# Patient Record
Sex: FEMALE | Race: WHITE | Employment: FULL TIME | URBAN - METROPOLITAN AREA
[De-identification: names, ages, dates, MRNs, and addresses within clinical notes are randomized per-mention and may not be internally consistent; named-entity substitution may affect disease eponyms.]

---

## 2017-05-05 LAB
ANTIBODY SCREEN, EXTERNAL: NORMAL
CHLAMYDIA, EXTERNAL: NORMAL
HBSAG, EXTERNAL: NORMAL
HIV, EXTERNAL: NORMAL
N. GONORRHEA, EXTERNAL: NORMAL
RPR, EXTERNAL: NORMAL
RUBELLA, EXTERNAL: NORMAL
TYPE, ABO & RH, EXTERNAL: NORMAL

## 2017-11-10 LAB — GRBS, EXTERNAL: NEGATIVE

## 2017-12-24 ENCOUNTER — HOSPITAL ENCOUNTER (EMERGENCY)
Age: 37
Discharge: HOME | End: 2017-12-24
Attending: OBSTETRICS & GYNECOLOGY | Admitting: OBSTETRICS & GYNECOLOGY
Payer: COMMERCIAL

## 2017-12-24 PROCEDURE — 99283 EMERGENCY DEPT VISIT LOW MDM: CPT

## 2017-12-24 PROCEDURE — 59025 FETAL NON-STRESS TEST: CPT

## 2017-12-24 NOTE — IP AVS SNAPSHOT
[address] [facility] 
 
 
 [address] [place] [address] 
[phone] Patient: Princess Mayo MRN: [id number] :1980 About your hospitalization You were admitted on:  2017 You last received care in the:  THE [address] [address] You were discharged on:  2017 Why you were hospitalized Your primary diagnosis was:  Not on File Things You Need To Do (next 8 weeks) Follow up with Florida Low MD  
  
Phone:  [phone] Where:  11 [facility], [address] [place] [address] Discharge Orders None A check nely indicates which time of day the medication should be taken. My Medications Notice You have not been prescribed any medications. Discharge Instructions None Introducing [facility] SERVICES! Camilo Hannah introduces [employer] patient portal. Now you can access parts of your medical record, email your doctor's office, and request medication refills online. 1. In your internet browser, go to [email or web address] 2. Click on the First Time User? Click Here link in the Sign In box. You will see the New Member Sign Up page. 3. Enter your [employer] Access Code exactly as it appears below. You will not need to use this code after youve completed the sign-up process. If you do not sign up before the expiration date, you must request a new code. · [employer] Access Code: [email or web address] Expires: 3/24/2018 11:06 PM 
 
4. Enter the last four digits of your Social Security Number (xxxx) and Date of Birth (mm/dd/yyyy) as indicated and click Submit. You will be taken to the next sign-up page. 5. Create a [employer] ID. This will be your [employer] login ID and cannot be changed, so think of one that is secure and easy to remember. 6. Create a [employer] password. You can change your password at any time. 7. Enter your Password Reset Question and Answer. This can be used at a later time if you forget your password. 8. Enter your e-mail address. You will receive e-mail notification when new information is available in [address] 9. Click Sign Up. You can now view and download portions of your medical record. 10. Click the Download Summary menu link to download a portable copy of your medical information. If you have questions, please visit the Frequently Asked Questions section of the [employer] website. Remember, [employer] is NOT to be used for urgent needs. For medical emergencies, dial 911. Now available from your iPhone and Android! Providers Seen During Your Hospitalization Provider Specialty Primary office phone Karla Campos MD Obstetrics & Gynecology [phone] Your Primary Care Physician (PCP) Primary Care Physician Office Phone Office Fax 510 Marcelo Rodriguez [phone] You are allergic to the following Not on File Recent Documentation OB Status Pregnant Emergency Contacts Name Discharge Info Relation Home Work Mobile BlakealexaRudy DISCHARGE CAREGIVER [3] Spouse [3]   [phone] Patient Belongings The following personal items are in your possession at time of discharge: 
                             
 
  
  
Discharge Instructions Attachments/References PREGNANCY: WEEK 41 (ENGLISH) PREGNANCY: KICK COUNTS (ENGLISH) Patient Handouts Week 41 of Your Pregnancy: Care Instructions Your Care Instructions By now, you're probably tired of people asking you when the baby is going to be born. Your baby could come any Cecile Gong today! Your doctor wants to make sure that you have a safe birth and so may recommend giving you medicines to start labor or scheduling a  delivery. Most women who give birth after their due dates have healthy newborns.  But you may have tests to make sure everything is okay. This care sheet will help you prepare for giving birth after 41 weeks. Follow-up care is a key part of your treatment and safety. Be sure to make and go to all appointments, and call your doctor if you are having problems. It's also a good idea to know your test results and keep a list of the medicines you take. How can you care for yourself at home? At about 41 weeks · Your doctor will measure the amount of fluid surrounding the baby and test your baby's movement and heart rate. · If your baby seems strong and well, your doctor might give you medicine to start labor. · If there are other concerns, your doctor may tell you that a  delivery would be best for you and your baby. After 42 weeks · Your baby may be harder to deliver. · The placenta may no longer be able to meet your baby's needs. · The baby might have a bowel movement into the amniotic fluid, which could go into the baby's lungs. Ease or reduce swelling in your feet, ankles, hands, and fingers · If your fingers are puffy, take off your rings. · Do not eat high-salt foods, such as potato chips. · Prop up your feet on a stool or couch as much as possible. Sleep with pillows under your feet. · Do not stand for long periods of time or wear tight shoes. · Wear support stockings. Where can you learn more? Go to http://richard-myles.info/. Enter U825 in the search box to learn more about \"Week 41 of Your Pregnancy: Care Instructions. \" Current as of: 2017 Content Version: 11.4 © 5510-9897 [employer]. Care instructions adapted under license by [employer] (which disclaims liability or warranty for this information). If you have questions about a medical condition or this instruction, always ask your healthcare professional. [place] [address] any warranty or liability for your use of this information. Counting Your Baby's Kicks: Care Instructions Your Care Instructions Counting your baby's kicks is one way your doctor can tell that your baby is healthy. Most women-especially in a first pregnancy-feel their baby move for the first time between 16 and 22 weeks. The movement may feel like flutters rather than kicks. Your baby may move more at certain times of the day. When you are active, you may notice less kicking than when you are resting. At your prenatal visits, your doctor will ask whether the baby is active. In your last trimester, your doctor may ask you to count the number of times you feel your baby move. Follow-up care is a key part of your treatment and safety. Be sure to make and go to all appointments, and call your doctor if you are having problems. It's also a good idea to know your test results and keep a list of the medicines you take. How do you count fetal kicks? · A common method of checking your baby's movement is to count the number of kicks or moves you feel in 1 hour. Ten movements (such as kicks, flutters, or rolls) in 1 hour are normal. Some doctors suggest that you count in the morning until you get to 10 movements. Then you can quit for that day and start again the next day. · Pick your baby's most active time of day to count. This may be any time from morning to evening. · If you do not feel 10 movements in an hour, your baby may be sleeping. Wait for the next hour and count again. When should you call for help? Call your doctor now or seek immediate medical care if: 
? · You noticed that your baby has stopped moving or is moving much less than normal. ? Watch closely for changes in your health, and be sure to contact your doctor if you have any problems. Where can you learn more? Go to http://richard-myles.info/. Enter C067 in the search box to learn more about \"Counting Your Baby's Kicks: Care Instructions. \" Current as of: March 16, 2017 Content Version: 11.4 © 8386-4743 Healthwise, Incorporated. Care instructions adapted under license by [employer] (which disclaims liability or warranty for this information). If you have questions about a medical condition or this instruction, always ask your healthcare professional. [address] any warranty or liability for your use of this information. Please provide this summary of care documentation to your next provider. Signatures-by signing, you are acknowledging that this After Visit Summary has been reviewed with you and you have received a copy. Patient Signature:  ____________________________________________________________ Date:  ____________________________________________________________  
  
[facility] Provider Signature:  ____________________________________________________________ Date:  ____________________________________________________________

## 2017-12-24 NOTE — IP AVS SNAPSHOT
[address] [place] [address] 
[phone] Patient: Elvis Lynn MRN: [id number] :1980 My Medications Notice You have not been prescribed any medications.

## 2017-12-25 PROCEDURE — 59025 FETAL NON-STRESS TEST: CPT

## 2017-12-25 PROCEDURE — 99283 EMERGENCY DEPT VISIT LOW MDM: CPT

## 2017-12-25 NOTE — PROGRESS NOTES
2100:   ambulated to unit c/o ctx.      :  SVE /-3. Pt advised to walk for 1 hour, then recheck per Dr Linda Calderon    :  Pt in ceja ambulating    :  SVE /-3.      :  Discussed d/c orders with pt. Questions answered.   Pt ambulated off unit with family

## 2017-12-26 ENCOUNTER — ANESTHESIA EVENT (OUTPATIENT)
Dept: LABOR AND DELIVERY | Age: 37
End: 2017-12-26
Payer: COMMERCIAL

## 2017-12-26 ENCOUNTER — HOSPITAL ENCOUNTER (INPATIENT)
Age: 37
LOS: 3 days | Discharge: HOME | End: 2017-12-29
Attending: OBSTETRICS & GYNECOLOGY | Admitting: OBSTETRICS & GYNECOLOGY
Payer: COMMERCIAL

## 2017-12-26 ENCOUNTER — ANESTHESIA (OUTPATIENT)
Dept: LABOR AND DELIVERY | Age: 37
End: 2017-12-26
Payer: COMMERCIAL

## 2017-12-26 PROBLEM — O48.0 POST-DATES PREGNANCY: Status: ACTIVE | Noted: 2017-12-26

## 2017-12-26 LAB
ABO + RH BLD: NORMAL
ALBUMIN SERPL-MCNC: 2.5 G/DL (ref 3.4–5)
ALBUMIN/GLOB SERPL: 0.6 {RATIO} (ref 0.8–1.7)
ALP SERPL-CCNC: 141 U/L (ref 45–117)
ALT SERPL-CCNC: 16 U/L (ref 13–56)
ANION GAP SERPL CALC-SCNC: 12 MMOL/L (ref 3–18)
AST SERPL-CCNC: 16 U/L (ref 15–37)
BASOPHILS # BLD: 0 K/UL (ref 0–0.06)
BASOPHILS NFR BLD: 0 % (ref 0–2)
BILIRUB SERPL-MCNC: 0.2 MG/DL (ref 0.2–1)
BLOOD GROUP ANTIBODIES SERPL: NORMAL
BUN SERPL-MCNC: 14 MG/DL (ref 7–18)
BUN/CREAT SERPL: 14 (ref 12–20)
CALCIUM SERPL-MCNC: 9.3 MG/DL (ref 8.5–10.1)
CHLORIDE SERPL-SCNC: 102 MMOL/L (ref 100–108)
CO2 SERPL-SCNC: 23 MMOL/L (ref 21–32)
CREAT SERPL-MCNC: 1 MG/DL (ref 0.6–1.3)
DIFFERENTIAL METHOD BLD: ABNORMAL
EOSINOPHIL # BLD: 0.1 K/UL (ref 0–0.4)
EOSINOPHIL NFR BLD: 1 % (ref 0–5)
ERYTHROCYTE [DISTWIDTH] IN BLOOD BY AUTOMATED COUNT: 13.9 % (ref 11.6–14.5)
GLOBULIN SER CALC-MCNC: 4.2 G/DL (ref 2–4)
GLUCOSE SERPL-MCNC: 105 MG/DL (ref 74–99)
HCT VFR BLD AUTO: 37.4 % (ref 35–45)
HGB BLD-MCNC: 13.2 G/DL (ref 12–16)
LDH SERPL L TO P-CCNC: 163 U/L (ref 81–234)
LYMPHOCYTES # BLD: 1.8 K/UL (ref 0.9–3.6)
LYMPHOCYTES NFR BLD: 20 % (ref 21–52)
MCH RBC QN AUTO: 34 PG (ref 24–34)
MCHC RBC AUTO-ENTMCNC: 35.3 G/DL (ref 31–37)
MCV RBC AUTO: 96.4 FL (ref 74–97)
MONOCYTES # BLD: 0.5 K/UL (ref 0.05–1.2)
MONOCYTES NFR BLD: 5 % (ref 3–10)
NEUTS SEG # BLD: 6.8 K/UL (ref 1.8–8)
NEUTS SEG NFR BLD: 74 % (ref 40–73)
PLATELET # BLD AUTO: 202 K/UL (ref 135–420)
PMV BLD AUTO: 11.7 FL (ref 9.2–11.8)
POTASSIUM SERPL-SCNC: 3.8 MMOL/L (ref 3.5–5.5)
PROT SERPL-MCNC: 6.7 G/DL (ref 6.4–8.2)
RBC # BLD AUTO: 3.88 M/UL (ref 4.2–5.3)
RBC MORPH BLD: ABNORMAL
SODIUM SERPL-SCNC: 137 MMOL/L (ref 136–145)
SPECIMEN EXP DATE BLD: NORMAL
URATE SERPL-MCNC: 5.6 MG/DL (ref 2.6–7.2)
WBC # BLD AUTO: 9.2 K/UL (ref 4.6–13.2)

## 2017-12-26 PROCEDURE — 76060000078 HC EPIDURAL ANESTHESIA

## 2017-12-26 PROCEDURE — 74011250636 HC RX REV CODE- 250/636: Performed by: ANESTHESIOLOGY

## 2017-12-26 PROCEDURE — 85025 COMPLETE CBC W/AUTO DIFF WBC: CPT | Performed by: OBSTETRICS & GYNECOLOGY

## 2017-12-26 PROCEDURE — 84550 ASSAY OF BLOOD/URIC ACID: CPT | Performed by: OBSTETRICS & GYNECOLOGY

## 2017-12-26 PROCEDURE — 86900 BLOOD TYPING SEROLOGIC ABO: CPT | Performed by: OBSTETRICS & GYNECOLOGY

## 2017-12-26 PROCEDURE — 74011250636 HC RX REV CODE- 250/636

## 2017-12-26 PROCEDURE — 80053 COMPREHEN METABOLIC PANEL: CPT | Performed by: OBSTETRICS & GYNECOLOGY

## 2017-12-26 PROCEDURE — 10907ZC DRAINAGE OF AMNIOTIC FLUID, THERAPEUTIC FROM PRODUCTS OF CONCEPTION, VIA NATURAL OR ARTIFICIAL OPENING: ICD-10-PCS | Performed by: OBSTETRICS & GYNECOLOGY

## 2017-12-26 PROCEDURE — 77030034849

## 2017-12-26 PROCEDURE — 83615 LACTATE (LD) (LDH) ENZYME: CPT | Performed by: OBSTETRICS & GYNECOLOGY

## 2017-12-26 PROCEDURE — 77030018749 HC HK AMNIO DISP DERY -A

## 2017-12-26 PROCEDURE — 36415 COLL VENOUS BLD VENIPUNCTURE: CPT | Performed by: OBSTETRICS & GYNECOLOGY

## 2017-12-26 PROCEDURE — 99282 EMERGENCY DEPT VISIT SF MDM: CPT

## 2017-12-26 PROCEDURE — 4A1H74Z MONITORING OF PRODUCTS OF CONCEPTION, CARDIAC ELECTRICAL ACTIVITY, VIA NATURAL OR ARTIFICIAL OPENING: ICD-10-PCS | Performed by: OBSTETRICS & GYNECOLOGY

## 2017-12-26 PROCEDURE — 77030009413 HC ELECTRD SCALP COVD -A

## 2017-12-26 PROCEDURE — 74011250636 HC RX REV CODE- 250/636: Performed by: OBSTETRICS & GYNECOLOGY

## 2017-12-26 PROCEDURE — 77030007879 HC KT SPN EPDRL TELE -B: Performed by: ANESTHESIOLOGY

## 2017-12-26 PROCEDURE — 3E033VJ INTRODUCTION OF OTHER HORMONE INTO PERIPHERAL VEIN, PERCUTANEOUS APPROACH: ICD-10-PCS | Performed by: OBSTETRICS & GYNECOLOGY

## 2017-12-26 PROCEDURE — 74011000250 HC RX REV CODE- 250

## 2017-12-26 PROCEDURE — 65270000029 HC RM PRIVATE

## 2017-12-26 RX ORDER — LIDOCAINE HYDROCHLORIDE 10 MG/ML
INJECTION INFILTRATION; PERINEURAL AS NEEDED
Status: DISCONTINUED | OUTPATIENT
Start: 2017-12-26 | End: 2017-12-27 | Stop reason: HOSPADM

## 2017-12-26 RX ORDER — LIDOCAINE HYDROCHLORIDE 10 MG/ML
20 INJECTION, SOLUTION EPIDURAL; INFILTRATION; INTRACAUDAL; PERINEURAL AS NEEDED
Status: DISCONTINUED | OUTPATIENT
Start: 2017-12-26 | End: 2017-12-27 | Stop reason: HOSPADM

## 2017-12-26 RX ORDER — FENTANYL CITRATE 50 UG/ML
INJECTION, SOLUTION INTRAMUSCULAR; INTRAVENOUS AS NEEDED
Status: DISCONTINUED | OUTPATIENT
Start: 2017-12-26 | End: 2017-12-27 | Stop reason: HOSPADM

## 2017-12-26 RX ORDER — OXYTOCIN/RINGER'S LACTATE 20/1000 ML
125 PLASTIC BAG, INJECTION (ML) INTRAVENOUS CONTINUOUS
Status: DISCONTINUED | OUTPATIENT
Start: 2017-12-26 | End: 2017-12-27 | Stop reason: HOSPADM

## 2017-12-26 RX ORDER — MINERAL OIL
30 OIL (ML) ORAL AS NEEDED
Status: COMPLETED | OUTPATIENT
Start: 2017-12-26 | End: 2017-12-27

## 2017-12-26 RX ORDER — LIDOCAINE HYDROCHLORIDE AND EPINEPHRINE 15; 5 MG/ML; UG/ML
INJECTION, SOLUTION EPIDURAL AS NEEDED
Status: DISCONTINUED | OUTPATIENT
Start: 2017-12-26 | End: 2017-12-27 | Stop reason: HOSPADM

## 2017-12-26 RX ORDER — SODIUM CHLORIDE, SODIUM LACTATE, POTASSIUM CHLORIDE, CALCIUM CHLORIDE 600; 310; 30; 20 MG/100ML; MG/100ML; MG/100ML; MG/100ML
125 INJECTION, SOLUTION INTRAVENOUS CONTINUOUS
Status: DISCONTINUED | OUTPATIENT
Start: 2017-12-26 | End: 2017-12-27 | Stop reason: HOSPADM

## 2017-12-26 RX ORDER — SODIUM CHLORIDE 0.9 % (FLUSH) 0.9 %
5-10 SYRINGE (ML) INJECTION AS NEEDED
Status: DISCONTINUED | OUTPATIENT
Start: 2017-12-26 | End: 2017-12-27 | Stop reason: HOSPADM

## 2017-12-26 RX ORDER — SODIUM CHLORIDE 0.9 % (FLUSH) 0.9 %
5-10 SYRINGE (ML) INJECTION EVERY 8 HOURS
Status: DISCONTINUED | OUTPATIENT
Start: 2017-12-26 | End: 2017-12-27 | Stop reason: HOSPADM

## 2017-12-26 RX ORDER — PHENYLEPHRINE HCL IN 0.9% NACL 1 MG/10 ML
80 SYRINGE (ML) INTRAVENOUS AS NEEDED
Status: DISCONTINUED | OUTPATIENT
Start: 2017-12-26 | End: 2017-12-27 | Stop reason: HOSPADM

## 2017-12-26 RX ORDER — ZOLPIDEM TARTRATE 5 MG/1
5 TABLET ORAL
Status: DISCONTINUED | OUTPATIENT
Start: 2017-12-26 | End: 2017-12-29 | Stop reason: HOSPADM

## 2017-12-26 RX ORDER — BUTORPHANOL TARTRATE 2 MG/ML
2 INJECTION INTRAMUSCULAR; INTRAVENOUS
Status: DISCONTINUED | OUTPATIENT
Start: 2017-12-26 | End: 2017-12-27 | Stop reason: HOSPADM

## 2017-12-26 RX ORDER — FENTANYL CITRATE 50 UG/ML
100 INJECTION, SOLUTION INTRAMUSCULAR; INTRAVENOUS ONCE
Status: ACTIVE | OUTPATIENT
Start: 2017-12-26 | End: 2017-12-27

## 2017-12-26 RX ORDER — FENTANYL/ROPIVACAINE/NS/PF 2MCG/ML-.1
1-15 PLASTIC BAG, INJECTION (ML) EPIDURAL CONTINUOUS
Status: DISCONTINUED | OUTPATIENT
Start: 2017-12-26 | End: 2017-12-27 | Stop reason: HOSPADM

## 2017-12-26 RX ORDER — NALOXONE HYDROCHLORIDE 0.4 MG/ML
0.2 INJECTION, SOLUTION INTRAMUSCULAR; INTRAVENOUS; SUBCUTANEOUS AS NEEDED
Status: DISCONTINUED | OUTPATIENT
Start: 2017-12-26 | End: 2017-12-27 | Stop reason: HOSPADM

## 2017-12-26 RX ORDER — FENTANYL CITRATE 50 UG/ML
INJECTION, SOLUTION INTRAMUSCULAR; INTRAVENOUS
Status: DISPENSED
Start: 2017-12-26 | End: 2017-12-27

## 2017-12-26 RX ORDER — OXYTOCIN IN 5 % DEXTROSE 30/500 ML
.5-2 PLASTIC BAG, INJECTION (ML) INTRAVENOUS
Status: DISCONTINUED | OUTPATIENT
Start: 2017-12-26 | End: 2017-12-29 | Stop reason: HOSPADM

## 2017-12-26 RX ORDER — NALBUPHINE HYDROCHLORIDE 10 MG/ML
10 INJECTION, SOLUTION INTRAMUSCULAR; INTRAVENOUS; SUBCUTANEOUS
Status: DISCONTINUED | OUTPATIENT
Start: 2017-12-26 | End: 2017-12-27 | Stop reason: HOSPADM

## 2017-12-26 RX ORDER — HYDROMORPHONE HYDROCHLORIDE 2 MG/ML
1 INJECTION, SOLUTION INTRAMUSCULAR; INTRAVENOUS; SUBCUTANEOUS
Status: DISCONTINUED | OUTPATIENT
Start: 2017-12-26 | End: 2017-12-27 | Stop reason: HOSPADM

## 2017-12-26 RX ORDER — ONDANSETRON 2 MG/ML
4 INJECTION INTRAMUSCULAR; INTRAVENOUS
Status: DISCONTINUED | OUTPATIENT
Start: 2017-12-26 | End: 2017-12-27 | Stop reason: HOSPADM

## 2017-12-26 RX ORDER — OXYTOCIN IN 5 % DEXTROSE 30/500 ML
PLASTIC BAG, INJECTION (ML) INTRAVENOUS
Status: COMPLETED
Start: 2017-12-26 | End: 2017-12-26

## 2017-12-26 RX ORDER — TERBUTALINE SULFATE 1 MG/ML
0.25 INJECTION SUBCUTANEOUS
Status: DISCONTINUED | OUTPATIENT
Start: 2017-12-26 | End: 2017-12-27 | Stop reason: HOSPADM

## 2017-12-26 RX ORDER — MISOPROSTOL 100 UG/1
25 TABLET ORAL ONCE
Status: DISCONTINUED | OUTPATIENT
Start: 2017-12-26 | End: 2017-12-26

## 2017-12-26 RX ORDER — OXYTOCIN/RINGER'S LACTATE 20/1000 ML
500 PLASTIC BAG, INJECTION (ML) INTRAVENOUS ONCE
Status: ACTIVE | OUTPATIENT
Start: 2017-12-26 | End: 2017-12-26

## 2017-12-26 RX ORDER — METHYLERGONOVINE MALEATE 0.2 MG/ML
0.2 INJECTION INTRAVENOUS AS NEEDED
Status: DISCONTINUED | OUTPATIENT
Start: 2017-12-26 | End: 2017-12-27 | Stop reason: HOSPADM

## 2017-12-26 RX ADMIN — Medication 14 MILLI-UNITS/MIN: at 15:00

## 2017-12-26 RX ADMIN — Medication 2 MILLI-UNITS/MIN: at 09:00

## 2017-12-26 RX ADMIN — Medication 10 MILLI-UNITS/MIN: at 13:57

## 2017-12-26 RX ADMIN — ROPIVACAINE HYDROCHLORIDE 12 ML/HR: 10 INJECTION, SOLUTION EPIDURAL at 22:01

## 2017-12-26 RX ADMIN — Medication 12 MILLI-UNITS/MIN: at 14:19

## 2017-12-26 RX ADMIN — SODIUM CHLORIDE, SODIUM LACTATE, POTASSIUM CHLORIDE, AND CALCIUM CHLORIDE 125 ML/HR: 600; 310; 30; 20 INJECTION, SOLUTION INTRAVENOUS at 19:08

## 2017-12-26 RX ADMIN — FENTANYL CITRATE 100 MCG: 50 INJECTION, SOLUTION INTRAMUSCULAR; INTRAVENOUS at 15:38

## 2017-12-26 RX ADMIN — SODIUM CHLORIDE, SODIUM LACTATE, POTASSIUM CHLORIDE, AND CALCIUM CHLORIDE 1000 ML: 600; 310; 30; 20 INJECTION, SOLUTION INTRAVENOUS at 15:48

## 2017-12-26 RX ADMIN — BUTORPHANOL TARTRATE 2 MG: 2 INJECTION, SOLUTION INTRAMUSCULAR; INTRAVENOUS at 13:35

## 2017-12-26 RX ADMIN — LIDOCAINE HYDROCHLORIDE AND EPINEPHRINE 2 ML: 15; 5 INJECTION, SOLUTION EPIDURAL at 15:36

## 2017-12-26 RX ADMIN — LIDOCAINE HYDROCHLORIDE AND EPINEPHRINE 3 ML: 15; 5 INJECTION, SOLUTION EPIDURAL at 15:37

## 2017-12-26 RX ADMIN — SODIUM CHLORIDE, SODIUM LACTATE, POTASSIUM CHLORIDE, AND CALCIUM CHLORIDE 125 ML/HR: 600; 310; 30; 20 INJECTION, SOLUTION INTRAVENOUS at 08:00

## 2017-12-26 RX ADMIN — Medication 4 MILLI-UNITS/MIN: at 17:08

## 2017-12-26 RX ADMIN — LIDOCAINE HYDROCHLORIDE 5 ML: 10 INJECTION INFILTRATION; PERINEURAL at 15:31

## 2017-12-26 RX ADMIN — ROPIVACAINE HYDROCHLORIDE 12 ML/HR: 10 INJECTION, SOLUTION EPIDURAL at 15:41

## 2017-12-26 RX ADMIN — SODIUM CHLORIDE, SODIUM LACTATE, POTASSIUM CHLORIDE, AND CALCIUM CHLORIDE 125 ML/HR: 600; 310; 30; 20 INJECTION, SOLUTION INTRAVENOUS at 13:58

## 2017-12-26 NOTE — IP AVS SNAPSHOT
[address] [place] [address] 
[phone] Patient: Imelda Senior MRN: [id number] :1980 My Medications TAKE these medications as instructed Instructions Each Dose to Equal  
 Morning Noon Evening Bedtime  
 ibuprofen 800 mg tablet Commonly known as:  MOTRIN Your last dose was: Your next dose is: Take 1 Tab by mouth every eight (8) hours as needed. Indications: Pain 800 mg PRENATAL PO Your last dose was: Your next dose is: Take  by mouth. VITAMIN D2 PO Your last dose was: Your next dose is: Take  by mouth. Where to Get Your Medications Information on where to get these meds will be given to you by the nurse or doctor. ! Ask your nurse or doctor about these medications  
  ibuprofen 800 mg tablet

## 2017-12-26 NOTE — ANESTHESIA PROCEDURE NOTES
Epidural Block    Start time: 12/26/2017 3:28 PM  End time: 12/26/2017 3:41 PM  Performed by: Venkatesh Potts  Authorized by: Venkatesh Potts     Pre-Procedure  Indication: at surgeon's request and labor epidural    Preanesthetic Checklist: patient identified, risks and benefits discussed, anesthesia consent, site marked, patient being monitored, timeout performed and anesthesia consent    Timeout Time: 15:28        Epidural:   Patient position:  Seated  Prep region:  Lumbar  Prep: Chlorhexidine    Location:  L3-4    Needle and Epidural Catheter:   Needle Type:  Tuohy  Needle Gauge:  17 G  Injection Technique:  Loss of resistance using air and loss of resistance using saline  Attempts:  1  Catheter Size:  20 G  Catheter at Skin Depth (cm):  10  Depth in Epidural Space (cm):  5  Events: no blood with aspiration, no cerebrospinal fluid with aspiration and no paresthesia    Test Dose:  Lidocaine 1.5% w/ epi and negative    Assessment:   Catheter Secured:  Tegaderm and tape  Insertion:  Uncomplicated  Patient tolerance:  Patient tolerated the procedure well with no immediate complications  After epidural placement, patient without sensory or motor block. Patient denies headache or backache.

## 2017-12-26 NOTE — PROGRESS NOTES
5747 Dr. Alma Katz informed of SVE, orders received for one time dose of 25mcg vaginal cytotec, pt bandar on own at this time. Will continue with original plan for pitocin induction. FHT reviewed by MD at this time. 56 Dr. Alma Katz informed of elevated BPs, POC is to add on [facility] panel. MD will be to the unit to assess labor progress by noon. 1130 [facility] panel reviewed with Dr. Alma Katz, no orders received. 1500 Bedside and Verbal shift change report given to KELY Lawson RN (oncoming nurse) by Gregoria Willis Rn (offgoing nurse). Report included the following information SBAR, Kardex, Intake/Output and MAR.

## 2017-12-26 NOTE — ANESTHESIA PREPROCEDURE EVALUATION
Anesthetic History   No history of anesthetic complications            Review of Systems / Medical History  Patient summary reviewed, nursing notes reviewed and pertinent labs reviewed    Pulmonary  Within defined limits                 Neuro/Psych   Within defined limits           Cardiovascular  Within defined limits                Exercise tolerance: >4 METS     GI/Hepatic/Renal  Within defined limits              Endo/Other  Within defined limits           Other Findings              Physical Exam    Airway  Mallampati: III  TM Distance: 4 - 6 cm  Neck ROM: decreased range of motion   Mouth opening: Normal     Cardiovascular  Regular rate and rhythm,  S1 and S2 normal,  no murmur, click, rub, or gallop  Rhythm: regular  Rate: normal         Dental  No notable dental hx       Pulmonary  Breath sounds clear to auscultation               Abdominal  GI exam deferred       Other Findings            Anesthetic Plan    ASA: 2  Anesthesia type: epidural            Anesthetic plan and risks discussed with: Patient and Spouse      Risks of bleeding, infection, nerve injury, failed block, spinal tap causing headache and requiring epidural blood patch, back pain, and failed block discussed. Procedure described as elective. Pt understands and desires to proceed.

## 2017-12-26 NOTE — PROGRESS NOTES
OB Progress Note    S: Pt reports pain moderate    O: Patient Vitals for the past 4 hrs:   Temp Pulse Resp BP   17 1306 97.9 °F (36.6 °C) - - -   17 1129 97.4 °F (36.3 °C) 90 17 141/86   17 1127 - 90 - 141/86   17 1003 - 84 - 141/90            VE: 2/60/-1; AROM to thin meconium       TOCO: irregular       FHT: category 1        Presentation:  A/P: IUP at  [id number]          Anticipate           GBS -    Jarvis Cooley MD  2017

## 2017-12-26 NOTE — IP AVS SNAPSHOT
[address] [place] [address] 
[phone] Patient: Onelia Wilkerson MRN: [id number] :1980 About your hospitalization You were admitted on:  2017 You last received care in the:  [address] [place] [address] You were discharged on:  2017 Why you were hospitalized Your primary diagnosis was:  Not on File Your diagnoses also included:  Post-Dates Pregnancy, Meconium In Amniotic Fluid First Noted During Labor Or Delivery In Liveborn Infant, Variable Fetal Heart Rate Decelerations, Delivered Things You Need To Do (next 8 weeks) Follow up with Willy Aguirre MD  
  
Phone:  [phone] Where:  [address] [facility], [address] [place] [address] Discharge Orders None A check nely indicates which time of day the medication should be taken. My Medications TAKE these medications as instructed Instructions Each Dose to Equal  
 Morning Noon Evening Bedtime  
 ibuprofen 800 mg tablet Commonly known as:  MOTRIN Your last dose was: Your next dose is: Take 1 Tab by mouth every eight (8) hours as needed. Indications: Pain 800 mg PRENATAL PO Your last dose was: Your next dose is: Take  by mouth. VITAMIN D2 PO Your last dose was: Your next dose is: Take  by mouth. Where to Get Your Medications Information on where to get these meds will be given to you by the nurse or doctor. ! Ask your nurse or doctor about these medications  
  ibuprofen 800 mg tablet Discharge Instructions None Introducing [facility] SERVICES! Roque De Leon introduces [employer] patient portal. Now you can access parts of your medical record, email your doctor's office, and request medication refills online. 1. In your internet browser, go to [email or web address] 2. Click on the First Time User? Click Here link in the Sign In box. You will see the New Member Sign Up page. 3. Enter your [employer] Access Code exactly as it appears below. You will not need to use this code after youve completed the sign-up process. If you do not sign up before the expiration date, you must request a new code. · [employer] Access Code: [email or web address] Expires: 3/24/2018 11:06 PM 
 
4. Enter the last four digits of your Social Security Number (xxxx) and Date of Birth (mm/dd/yyyy) as indicated and click Submit. You will be taken to the next sign-up page. 5. Create a [employer] ID. This will be your [employer] login ID and cannot be changed, so think of one that is secure and easy to remember. 6. Create a [employer] password. You can change your password at any time. 7. Enter your Password Reset Question and Answer. This can be used at a later time if you forget your password. 8. Enter your e-mail address. You will receive e-mail notification when new information is available in [address] 9. Click Sign Up. You can now view and download portions of your medical record. 10. Click the Download Summary menu link to download a portable copy of your medical information. If you have questions, please visit the Frequently Asked Questions section of the [employer] website. Remember, [employer] is NOT to be used for urgent needs. For medical emergencies, dial 911. Now available from your iPhone and Android! Providers Seen During Your Hospitalization Provider Specialty Primary office phone Leticia Brito MD Obstetrics & Gynecology [phone] Kadi Lopez MD Obstetrics & Gynecology [phone] Your Primary Care Physician (PCP) Primary Care Physician Office Phone Office Fax 510 Marcelo Rodriguezarlene [phone] You are allergic to the following No active allergies Recent Documentation Height Weight Breastfeeding? BMI OB Status 1.651 m 96.6 kg Unknown 35.45 kg/m2 Recent pregnancy Emergency Contacts Name Discharge Info Relation Home Work Mobile Rudy Freeman DISCHARGE CAREGIVER [3] Spouse [3]   [phone] Patient Belongings The following personal items are in your possession at time of discharge: 
  Dental Appliances: None  Visual Aid: Glasses      Home Medications: Kept at bedside (prenatals)   Jewelry: None  Clothing: At bedside, Pants, Shirt    Other Valuables: Cell Phone, [employer] Please provide this summary of care documentation to your next provider. Signatures-by signing, you are acknowledging that this After Visit Summary has been reviewed with you and you have received a copy. Patient Signature:  ____________________________________________________________ Date:  ____________________________________________________________  
  
[facility] Provider Signature:  ____________________________________________________________ Date:  ____________________________________________________________

## 2017-12-26 NOTE — PROGRESS NOTES
1505 patient request for epidural, IV blousing, supplies gathered   1 Dr. Christa Spaulding informed of patient request for epidural.   1523 Dr. Bartholomew Necessary at bedside   1528 Time out completed   1536 Epidural catheter in.  1537 Test dose  1538 Bolus Dose  1547 PCEA pump set up and double verified by MD, educated patient about PCEA button.

## 2017-12-26 NOTE — PROGRESS NOTES
36 Dr Dex Frederick at bedside, [facility] reviewed, SVE unchanged. Stated would possibly AROM pt at 1300.   1310 Dr. Dex Frederick at bedside, AROM patient, thin meconium. [facility] Dr. Dex Frederick at bedside, reviewed strip, pitocin restarted. 1630 Harper Inserted with second RN at [address] [facility] discussed with Benitez Farris , pitocin stopped, to be restarted in the morning, regular diet ordered at this time. 1921  Bedside and Verbal shift change report given to William BECERRA (oncoming nurse) by [place]. Maryellen Shelton RN (offgoing nurse). Report included the following informationSBAR, Kardex, Intake/Output, MAR and Recent Results.

## 2017-12-27 PROCEDURE — 75410000003 HC RECOV DEL/VAG/CSECN EA 0.5 HR

## 2017-12-27 PROCEDURE — 74011250636 HC RX REV CODE- 250/636: Performed by: OBSTETRICS & GYNECOLOGY

## 2017-12-27 PROCEDURE — 76060000078 HC EPIDURAL ANESTHESIA

## 2017-12-27 PROCEDURE — 74011000258 HC RX REV CODE- 258: Performed by: OBSTETRICS & GYNECOLOGY

## 2017-12-27 PROCEDURE — 65270000029 HC RM PRIVATE

## 2017-12-27 PROCEDURE — 0KQM0ZZ REPAIR PERINEUM MUSCLE, OPEN APPROACH: ICD-10-PCS | Performed by: OBSTETRICS & GYNECOLOGY

## 2017-12-27 PROCEDURE — 77010026065 HC OXYGEN MINIMUM MEDICAL AIR

## 2017-12-27 PROCEDURE — 74011000250 HC RX REV CODE- 250

## 2017-12-27 PROCEDURE — 77030010848 HC CATH INTUTR PRSS KOLB -B

## 2017-12-27 PROCEDURE — 75410000000 HC DELIVERY VAGINAL/SINGLE

## 2017-12-27 PROCEDURE — 74011250636 HC RX REV CODE- 250/636: Performed by: ANESTHESIOLOGY

## 2017-12-27 PROCEDURE — 74011250637 HC RX REV CODE- 250/637: Performed by: OBSTETRICS & GYNECOLOGY

## 2017-12-27 PROCEDURE — 74011250636 HC RX REV CODE- 250/636

## 2017-12-27 RX ORDER — PENICILLIN G POTASSIUM 5000000 [IU]/1
2.5 INJECTION, POWDER, FOR SOLUTION INTRAMUSCULAR; INTRAVENOUS EVERY 4 HOURS
Status: DISCONTINUED | OUTPATIENT
Start: 2017-12-27 | End: 2017-12-29

## 2017-12-27 RX ORDER — ACETAMINOPHEN 325 MG/1
650 TABLET ORAL
Status: DISCONTINUED | OUTPATIENT
Start: 2017-12-27 | End: 2017-12-29 | Stop reason: HOSPADM

## 2017-12-27 RX ORDER — FENTANYL CITRATE 50 UG/ML
INJECTION, SOLUTION INTRAMUSCULAR; INTRAVENOUS
Status: COMPLETED
Start: 2017-12-27 | End: 2017-12-27

## 2017-12-27 RX ORDER — AMOXICILLIN 250 MG
1 CAPSULE ORAL
Status: DISCONTINUED | OUTPATIENT
Start: 2017-12-27 | End: 2017-12-29 | Stop reason: HOSPADM

## 2017-12-27 RX ORDER — LIDOCAINE HYDROCHLORIDE 10 MG/ML
INJECTION INFILTRATION; PERINEURAL
Status: COMPLETED
Start: 2017-12-27 | End: 2017-12-27

## 2017-12-27 RX ORDER — BUPIVACAINE HYDROCHLORIDE 2.5 MG/ML
INJECTION, SOLUTION EPIDURAL; INFILTRATION; INTRACAUDAL AS NEEDED
Status: DISCONTINUED | OUTPATIENT
Start: 2017-12-27 | End: 2017-12-27 | Stop reason: HOSPADM

## 2017-12-27 RX ORDER — PENICILLIN G POTASSIUM 5000000 [IU]/1
5 INJECTION, POWDER, FOR SOLUTION INTRAMUSCULAR; INTRAVENOUS ONCE
Status: DISCONTINUED | OUTPATIENT
Start: 2017-12-27 | End: 2017-12-27 | Stop reason: CLARIF

## 2017-12-27 RX ORDER — PROMETHAZINE HYDROCHLORIDE 25 MG/ML
25 INJECTION, SOLUTION INTRAMUSCULAR; INTRAVENOUS
Status: DISCONTINUED | OUTPATIENT
Start: 2017-12-27 | End: 2017-12-29 | Stop reason: HOSPADM

## 2017-12-27 RX ORDER — IBUPROFEN 400 MG/1
800 TABLET ORAL
Status: DISCONTINUED | OUTPATIENT
Start: 2017-12-27 | End: 2017-12-29 | Stop reason: HOSPADM

## 2017-12-27 RX ORDER — OXYCODONE AND ACETAMINOPHEN 5; 325 MG/1; MG/1
2 TABLET ORAL
Status: DISCONTINUED | OUTPATIENT
Start: 2017-12-27 | End: 2017-12-29 | Stop reason: HOSPADM

## 2017-12-27 RX ORDER — ZOLPIDEM TARTRATE 5 MG/1
5 TABLET ORAL
Status: DISCONTINUED | OUTPATIENT
Start: 2017-12-27 | End: 2017-12-29 | Stop reason: HOSPADM

## 2017-12-27 RX ORDER — LIDOCAINE HYDROCHLORIDE 10 MG/ML
200 INJECTION INFILTRATION; PERINEURAL ONCE
Status: COMPLETED | OUTPATIENT
Start: 2017-12-27 | End: 2017-12-27

## 2017-12-27 RX ADMIN — LIDOCAINE HYDROCHLORIDE 3 ML: 10 INJECTION INFILTRATION; PERINEURAL at 09:51

## 2017-12-27 RX ADMIN — LIDOCAINE HYDROCHLORIDE AND EPINEPHRINE 3 ML: 15; 5 INJECTION, SOLUTION EPIDURAL at 09:56

## 2017-12-27 RX ADMIN — ROPIVACAINE HYDROCHLORIDE 12 ML/HR: 10 INJECTION, SOLUTION EPIDURAL at 08:12

## 2017-12-27 RX ADMIN — SODIUM CHLORIDE 5 MILLION UNITS: 900 INJECTION INTRAVENOUS at 12:27

## 2017-12-27 RX ADMIN — FENTANYL CITRATE 100 MCG: 50 INJECTION, SOLUTION INTRAMUSCULAR; INTRAVENOUS at 09:57

## 2017-12-27 RX ADMIN — Medication 30 ML: at 17:15

## 2017-12-27 RX ADMIN — LIDOCAINE HYDROCHLORIDE 20 ML: 10 INJECTION INFILTRATION; PERINEURAL at 17:30

## 2017-12-27 RX ADMIN — LIDOCAINE HYDROCHLORIDE 20 ML: 10 INJECTION, SOLUTION INFILTRATION; PERINEURAL at 17:30

## 2017-12-27 RX ADMIN — BUPIVACAINE HYDROCHLORIDE 6 ML: 2.5 INJECTION, SOLUTION EPIDURAL; INFILTRATION; INTRACAUDAL at 09:54

## 2017-12-27 RX ADMIN — IBUPROFEN 800 MG: 400 TABLET ORAL at 18:28

## 2017-12-27 RX ADMIN — ROPIVACAINE HYDROCHLORIDE 12 ML/HR: 10 INJECTION, SOLUTION EPIDURAL at 03:18

## 2017-12-27 RX ADMIN — LIDOCAINE HYDROCHLORIDE 3 ML: 10 INJECTION INFILTRATION; PERINEURAL at 12:32

## 2017-12-27 RX ADMIN — ROPIVACAINE HYDROCHLORIDE 12 ML/HR: 10 INJECTION, SOLUTION EPIDURAL at 13:31

## 2017-12-27 RX ADMIN — SODIUM CHLORIDE, SODIUM LACTATE, POTASSIUM CHLORIDE, AND CALCIUM CHLORIDE 125 ML/HR: 600; 310; 30; 20 INJECTION, SOLUTION INTRAVENOUS at 11:41

## 2017-12-27 RX ADMIN — LIDOCAINE HYDROCHLORIDE AND EPINEPHRINE 3 ML: 15; 5 INJECTION, SOLUTION EPIDURAL at 12:38

## 2017-12-27 RX ADMIN — BUPIVACAINE HYDROCHLORIDE 6 ML: 2.5 INJECTION, SOLUTION EPIDURAL; INFILTRATION; INTRACAUDAL at 12:36

## 2017-12-27 NOTE — PROGRESS NOTES
S. Great pain relief from epidural  O. Mat VSS. BP range 130-140-/ 80-90     FHR reactive- occas variable decels to 70's w/ contrac      Irregular contraction pattern      SVE unchanged from last exam. Posterior position    A. Slow progress in labor- posterior position reassuring fetal reactivity w/ variable decels. P. IUPC placed- amnioinfusion begun.       Monitor mat-fetal response to same

## 2017-12-27 NOTE — PROGRESS NOTES
624- RN in room to answer call bell. Patient requesting pad change. Patient's bed pad changed; pericare performed. Patient lying right lateral with pillow in between legs. Patient denies further needs at this time.

## 2017-12-27 NOTE — PROGRESS NOTES
Pelvic exam:  Cervix7, Effaced:100%Station:0 fhr with good variability. Ctx every 2 min.  Progressing in labor

## 2017-12-27 NOTE — PROGRESS NOTES
2342 Bedside and Verbal shift change report given to DAISY Lawson RN (oncoming nurse) by Leigh Keith RN (offgoing nurse). Report included the following information SBAR, Kardex, Procedure Summary, Intake/Output, MAR, Recent Results and Med Rec Status. 0220 CRNA paged for epidural bolus. 2909 Dr Rosalva Peralta at bedside. 3412 CRNA at bedside for epidural bolus. [facility] paged. Bolus not effective. 2813 [facility] in room. Pt will replace epidural.    0954 Bolus dose. 2523 Epidural catheter placed. 0957 Test dose. Fentanyl vial handed to Dr Lavinia Roldan. 102 [facility] in room to give pt epidural bolus. Pt has left sided pain. Turned pt to far left. 5 Dr Rosalva Peralta at bedside. Orders for antibiotics. See Bigg Bains Paged Dr Lavinia Roldan. Pt reporting left sided pain. [facility] in room. 1238 Bolus. 1240 New epidural placed. 1241 Test dose given. [address] Dr Rosalva Peralta reviewed pt's bps, no new orders. [address] at bedside. IUPC placed. Positive cough test. Bps reviewed. Orders for amnioinfusion. Educated pt re: plan of care. 1436 Amnioinfusion started. 500cc bolus then 125cc/hr. 1503 Pt complainng of rectal pressure. Sher Alan at bedside for VE. Test push. Pt turned to far right with peanut ball. 5 Dr Rosalva Peralta called in for an update. Sher Alan in house and gave report to Dr Rosalva Peralta, she is aware pt is an anterior lip. 200 Dr Rosalva Peralta called in for delivery. Harper dcd. 1 Dr Rosalva Peralta in room. Legs up. IUPC dcd by Dr Rosalva Peralta. 56  of viable boy, can x1 loose. 1905 UP to bathroom. New pad and gown placed. Bed linen changed.  Bedside and Verbal shift change report given to [address] (oncoming nurse) by DAISY Lawson RN (offgoing nurse). Report included the following information SBAR, Kardex, Procedure Summary, Intake/Output, MAR, Recent Results and Med Rec Status.

## 2017-12-27 NOTE — PROGRESS NOTES
S. Feeling pressure  O. Maternal VSS. DTR's +1/0       FHR reactive variables resolving  Somewhat w/ amnioinfusion.        SVE ant lip , ROP, 0 sta  A.  Good progress w/ adequate labor      Reassuring fetal status  P. Labor down

## 2017-12-27 NOTE — PROGRESS NOTES
Spoke to DEB Luevano concerning pt.'s low grade fever, tachycardia and the pt.'s c/o being hot. She was also informed about the patient's recurring variables and periods of marked variability. She noted she would review the patient's strip.

## 2017-12-27 NOTE — PROGRESS NOTES
Late entry for 1730-  Greer of care    O  Maternal VSS    Pitocin @ 16 mu w/ q 2 min mild contrx  FHR category 1    SVE 3 cm, 100%, vtx -2    A. Latent phase labor- reassuring fetal status    P. Turn off Pit, Have pt rest overnight and begin pit at 0400. POC discussed w/ pt and  they verbalize agreement w/ same.

## 2017-12-27 NOTE — PROGRESS NOTES
Delivery Note    Obstetrician: amador    Assistant: none    Pre-Delivery Diagnosis: Term pregnancy    Post-Delivery Diagnosis: Male    Intrapartum Event: None    Procedure: Spontaneous vaginal delivery    Epidural: YES    Monitor:  Fetal Heart Tones - Internal and Uterine Contractions - Internal    Indications for instrumental delivery: none    Estimated Blood Loss: 300    Episiotomy: none    Laceration(s):  2nd degree    Laceration(s) repair: YES with 2-0 vicrly    Presentation: Cephalic    Fetal Description: hoffman    Fetal Position: Occiput Anterior    Birth Weight:     Birth Length:     Apgar - One Minute: 8    Apgar - Five Minutes: 9    Umbilical Cord: 3 vessels present    Specimens: none           Complications:  none           Cord Blood Results:   Information for the patient's :  Lida Givens [[id number]]   No results found for: PCTABR, [address], PCTDIG, BILI, ABORH, ABORHEXT    Prenatal Labs:     Lab Results   Component Value Date/Time    ABO/Rh(D) O POSITIVE 2017 08:05 AM    HBsAg, External neg 2017    HIV, External neg 2017    Rubella, External Immune 2017    RPR, External NR 2017    Gonorrhea, External neg 2017    Chlamydia, External neg 2017    GrBStrep, External negative 11/10/2017        Attending Attestation: I was present and scrubbed for the entire procedure peds in attendance for delivery as well    Signed By:  Larisa Lott MD     2017

## 2017-12-28 LAB
HCT VFR BLD AUTO: 31.2 % (ref 35–45)
HGB BLD-MCNC: 10.9 G/DL (ref 12–16)

## 2017-12-28 PROCEDURE — 85014 HEMATOCRIT: CPT | Performed by: OBSTETRICS & GYNECOLOGY

## 2017-12-28 PROCEDURE — 74011250637 HC RX REV CODE- 250/637: Performed by: OBSTETRICS & GYNECOLOGY

## 2017-12-28 PROCEDURE — 65270000029 HC RM PRIVATE

## 2017-12-28 PROCEDURE — 85018 HEMOGLOBIN: CPT | Performed by: OBSTETRICS & GYNECOLOGY

## 2017-12-28 PROCEDURE — 36415 COLL VENOUS BLD VENIPUNCTURE: CPT | Performed by: OBSTETRICS & GYNECOLOGY

## 2017-12-28 RX ADMIN — IBUPROFEN 800 MG: 400 TABLET ORAL at 16:09

## 2017-12-28 RX ADMIN — IBUPROFEN 800 MG: 400 TABLET ORAL at 07:38

## 2017-12-28 NOTE — PROGRESS NOTES
Progress Note    Patient: Lane Chowdary MRN: [id number]  SSN: [id number]    YOB: 1980  Age: 42 y.o. Sex: female    ROOM:  [facility]/      Subjective:     Postpartum Day: 1            Delivery: vaginal delivery    The patient feels well. The patient denies emotional concerns. The baby is well. Baby is feeding via breast.  The patient is ambulating well. The patient  tolerating a normal diet. Flatus has been passed. Objective:      Patient Vitals for the past 24 hrs:   BP Temp Pulse Resp SpO2   12/28/17 0651 123/77 98.5 °F (36.9 °C) 76 16 99 %   12/27/17 2024 129/90 98.7 °F (37.1 °C) (!) 106 - -   12/27/17 1900 140/86 - (!) 116 - -   12/27/17 1858 154/83 - (!) 115 - -   12/27/17 1845 (!) 148/99 - (!) 112 - -   12/27/17 1830 143/83 - (!) 115 - -   12/27/17 1822 143/88 - 99 - -   12/27/17 1738 195/65 - (!) 124 - -   12/27/17 1500 147/84 - (!) 103 - 99 %   12/27/17 1451 147/84 - (!) 102 - -   12/27/17 1445 (!) 149/110 99.1 °F (37.3 °C) (!) 102 - 99 %   12/27/17 1430 (!) 145/95 - 96 - 99 %   12/27/17 1345 127/77 98.9 °F (37.2 °C) 99 - -   12/27/17 1330 141/86 - (!) 105 - -   12/27/17 1315 141/90 - (!) 112 - -   12/27/17 1300 141/87 - (!) 109 - -   12/27/17 1246 135/88 - (!) 117 - -   12/27/17 1213 - - - - 96 %   12/27/17 1208 - - - - 99 %   12/27/17 1203 - - - - 96 %   12/27/17 1200 148/88 - 89 - -   12/27/17 1130 143/86 - 91 - -   12/27/17 1128 - - - - 97 %   12/27/17 1101 130/78 - 97 - -   12/27/17 1048 - - - - 99 %   12/27/17 1045 (!) 147/91 - 95 - -   12/27/17 1043 - - - - 98 %   12/27/17 1038 - - - - 98 %   12/27/17 1000 134/83 98.7 °F (37.1 °C) (!) 119 - -   12/27/17 0901 (!) 158/98 - 88 - -   12/27/17 0859 (!) 156/103 - 85 - -   12/27/17 0830 140/78 - 91 - -   12/27/17 0800 137/77 - 90 - -     Lochia:  appropriate   Uterine Fundus:   firm   Fundus Location:  -3   Incision:      DVT Evaluation:  No evidence of DVT seen on physical exam.  Negative Estefani's sign. No cords or calf tenderness.   No significant calf/ankle edema. Lab/Data Review: All lab results for the last 24 hours reviewed. LABS: Recent Results (from the past 48 hour(s))   CBC WITH AUTOMATED DIFF    Collection Time: 12/26/17  8:05 AM   Result Value Ref Range    WBC 9.2 4.6 - 13.2 K/uL    RBC 3.88 (L) 4.20 - 5.30 M/uL    HGB 13.2 12.0 - 16.0 g/dL    HCT 37.4 35.0 - 45.0 %    MCV 96.4 74.0 - 97.0 FL    MCH 34.0 24.0 - 34.0 PG    MCHC 35.3 31.0 - 37.0 g/dL    RDW 13.9 11.6 - 14.5 %    PLATELET 874 164 - 920 K/uL    MPV 11.7 9.2 - 11.8 FL    NEUTROPHILS 74 (H) 40 - 73 %    LYMPHOCYTES 20 (L) 21 - 52 %    MONOCYTES 5 3 - 10 %    EOSINOPHILS 1 0 - 5 %    BASOPHILS 0 0 - 2 %    ABS. NEUTROPHILS 6.8 1.8 - 8.0 K/UL    ABS. LYMPHOCYTES 1.8 0.9 - 3.6 K/UL    ABS. MONOCYTES 0.5 0.05 - 1.2 K/UL    ABS. EOSINOPHILS 0.1 0.0 - 0.4 K/UL    ABS. BASOPHILS 0.0 0.0 - 0.06 K/UL    RBC COMMENTS CLUMPED PLATELETS  OCCASIONAL PLT CLUMP ON SMEAR        DF AUTOMATED     TYPE & SCREEN    Collection Time: 12/26/17  8:05 AM   Result Value Ref Range    Crossmatch Expiration 12/29/2017     ABO/Rh(D) O POSITIVE     Antibody screen NEG    METABOLIC PANEL, COMPREHENSIVE    Collection Time: 12/26/17  8:05 AM   Result Value Ref Range    Sodium 137 136 - 145 mmol/L    Potassium 3.8 3.5 - 5.5 mmol/L    Chloride 102 100 - 108 mmol/L    CO2 23 21 - 32 mmol/L    Anion gap 12 3.0 - 18 mmol/L    Glucose 105 (H) 74 - 99 mg/dL    BUN 14 7.0 - 18 MG/DL    Creatinine 1.00 0.6 - 1.3 MG/DL    BUN/Creatinine ratio 14 12 - 20      GFR est AA >60 >60 ml/min/1.73m2    GFR est non-AA >60 >60 ml/min/1.73m2    Calcium 9.3 8.5 - 10.1 MG/DL    Bilirubin, total 0.2 0.2 - 1.0 MG/DL    ALT (SGPT) 16 13 - 56 U/L    AST (SGOT) 16 15 - 37 U/L    Alk.  phosphatase 141 (H) 45 - 117 U/L    Protein, total 6.7 6.4 - 8.2 g/dL    Albumin 2.5 (L) 3.4 - 5.0 g/dL    Globulin 4.2 (H) 2.0 - 4.0 g/dL    A-G Ratio 0.6 (L) 0.8 - 1.7     LD    Collection Time: 12/26/17  8:05 AM   Result Value Ref Range     81 - 234 U/L   URIC ACID    Collection Time: 12/26/17  8:05 AM   Result Value Ref Range    Uric acid 5.6 2.6 - 7.2 MG/DL   HEMATOCRIT    Collection Time: 12/28/17  5:33 AM   Result Value Ref Range    HCT 31.2 (L) 35.0 - 45.0 %   HEMOGLOBIN    Collection Time: 12/28/17  5:33 AM   Result Value Ref Range    HGB 10.9 (L) 12.0 - 16.0 g/dL        Assessment:     Status post: Doing well postpartum vaginal delivery     Plan:     Postpartum care discussed including diet, ambulation, and actvitiy restrictions. Discussed circumcision: Benefits are that it may be easier to keep clean and various ethnic and Yazidism customs. Risks are bleeding most commonly, which is resolved with pressure or hemostatic gels or sponges. Scarring and infection are possible but extremely unlikely. It may not be be possible to remove all that we would like to remove if the shaft is short, as this would be more likely to lead to scarring. Wants circ done. Discharge instructions and questions answered.        Signed By: Zahra Mckenzie MD     December 28, 2017

## 2017-12-28 NOTE — LACTATION NOTE
Per mom, baby just finished nursing for 10 minutes and currently has the hiccups. Able to relatch infant and he took a few sucks. Breastfeeding basics, log sheet, possible tongue tie, and post circumcision feeding discussed. Will page for feeds.

## 2017-12-28 NOTE — PROGRESS NOTES
Bedside shift change report given to MAI Hairston RN (oncoming nurse) by Carrie Richard RN   (offgoing nurse). Report given with SBAR, Kardex, MAR and Recent Results.

## 2017-12-28 NOTE — PROGRESS NOTES
12/27/2017 1910- Bedside shift change report given to KELY Lawson RN (oncoming nurse) by TYREL Graham RN (offgoing nurse). Report included the following information SBAR. Patient sitting up holding infant. Family around. No questions at moment. 2004- patient taken to pp room 252.  2006- Bedside and Verbal shift change report given to PROMISE Gandhi RN (oncoming nurse) by TYREL Hadley (offgoing nurse). Report included the following information SBAR.

## 2017-12-28 NOTE — PROGRESS NOTES
Bedside and Verbal shift change report given to GIDEON Ceja LPN (oncoming nurse) by MAI Hairston RN (offgoing nurse). Report included the following information SBAR, Kardex, Intake/Output, MAR and Recent Results.

## 2017-12-28 NOTE — ANESTHESIA POSTPROCEDURE EVALUATION
12/28/2017  1:00 PM    Laboring Epidural Follow-up Note     Referring physician: Flex Donnelly MD   Patient status post vaginal delivery with labor epidural    Visit Vitals    /77 (BP 1 Location: Left arm, BP Patient Position: At rest)    Pulse 76    Temp 36.9 °C (98.5 °F)    Resp 16    Ht 5' 5\" (1.651 m)    Wt 96.6 kg (213 lb)    SpO2 99%    Breastfeeding Unknown    BMI 35.45 kg/m2       Epidural removed by L&D staff  No sedation, pruritis noted. Adequate analgesia.   No obvious anesthesia complications          Yamila Campbell, CRNA

## 2017-12-28 NOTE — PROGRESS NOTES
Baby boy Ruperto Brownjayjay. 1st child.  completed the initial Spiritual Assessment of the patient, and offered Pastoral Care. Baby [place] Card was provided. Chaplains will continue to follow and will provide pastoral care as needed or requested. [address], M.Div.   Board Certified   [phone] - Office

## 2017-12-29 VITALS
SYSTOLIC BLOOD PRESSURE: 123 MMHG | BODY MASS INDEX: 35.49 KG/M2 | RESPIRATION RATE: 16 BRPM | TEMPERATURE: 98.1 F | OXYGEN SATURATION: 98 % | HEIGHT: 65 IN | DIASTOLIC BLOOD PRESSURE: 79 MMHG | WEIGHT: 213 LBS | HEART RATE: 86 BPM

## 2017-12-29 PROCEDURE — 74011250637 HC RX REV CODE- 250/637: Performed by: OBSTETRICS & GYNECOLOGY

## 2017-12-29 RX ORDER — IBUPROFEN 800 MG/1
800 TABLET ORAL
Qty: 30 TAB | Refills: 0 | Status: SHIPPED | OUTPATIENT
Start: 2017-12-29

## 2017-12-29 RX ADMIN — IBUPROFEN 800 MG: 400 TABLET ORAL at 06:22

## 2017-12-29 NOTE — ROUTINE PROCESS
Bedside and Verbal shift change report given to Deborah Kramer RN  (oncoming nurse) by MARCIA [address] (offgoing nurse). Report included the following information SBAR, Kardex, Intake/Output, MAR and Recent Results.

## 2017-12-29 NOTE — ROUTINE PROCESS
Bedside and Verbal shift change report given to MARCIA Pabon (oncoming nurse) by MAI Hernandez (offgoing nurse). Report included the following information SBAR, Intake/Output, MAR and Recent Results.

## 2017-12-29 NOTE — ROUTINE PROCESS
Bedside and Verbal shift change report given to A. Severa Marshal RN  (oncoming nurse) by GIDEON Ceja LPN (offgoing nurse). Report given with SBAR, Kardex, Intake/Output, MAR and Recent Results.

## 2017-12-29 NOTE — ROUTINE PROCESS
I have reviewed discharge instructions with the patient. The patient verbalized understanding and is stable at this time. Patient and baby are rolled down via wheelchair to front entrance.

## 2017-12-29 NOTE — PROGRESS NOTES
Progress Note    Patient: Lane Chowdary MRN: [id number]  SSN: [id number]    YOB: 1980  Age: 42 y.o. Sex: female    ROOM:  [facility]/01      Subjective:     Postpartum Day: 2            Delivery: vaginal delivery    The patient feels well. The patient denies emotional concerns. The baby is well. Baby is feeding via breast.  The patient is ambulating well. The patient  tolerating a normal diet. Flatus has been passed. Objective:      Patient Vitals for the past 24 hrs:   BP Temp Pulse Resp SpO2   12/29/17 0617 123/79 98.1 °F (36.7 °C) 86 16 98 %   12/28/17 2133 130/84 98.4 °F (36.9 °C) 96 16 99 %     Lochia:  appropriate   Uterine Fundus:   firm   Fundus Location:  -1   Incision:  no significant drainage, no dehiscence, no significant erythema   DVT Evaluation:  No evidence of DVT seen on physical exam.  Negative Estefani's sign. No cords or calf tenderness. No significant calf/ankle edema. Lab/Data Review: All lab results for the last 24 hours reviewed. LABS: Recent Results (from the past 48 hour(s))   HEMATOCRIT    Collection Time: 12/28/17  5:33 AM   Result Value Ref Range    HCT 31.2 (L) 35.0 - 45.0 %   HEMOGLOBIN    Collection Time: 12/28/17  5:33 AM   Result Value Ref Range    HGB 10.9 (L) 12.0 - 16.0 g/dL        Assessment:     Status post: Doing well postpartum vaginal delivery     Plan:     Postpartum care discussed including diet, ambulation, and actvitiy restrictions. Discharge instructions and questions answered.        Signed By: Ramin Uribe CNM     December 29, 2017

## 2020-12-04 NOTE — PROGRESS NOTES
Pelvic exam:  Cervix4, Effaced:75%Station:-1 per rn fhr with accels, ctx every 2-3 min now on 6 mu pitocin since 5 am    Pt uncomfortable will rebolus epidural. No

## 2024-01-01 NOTE — H&P
----- Message from Emily sent at 2024 10:23 AM CDT -----  Contact: mom Kylee   Mom would cherry a call back to schedule the first  appt for Thursday   History & Physical    Name: Rosa Paulino MRN: [id number]  SSN: [id number]    YOB: 1980  Age: 42 y.o. Sex: female        Subjective:     Estimated Date of Delivery: 17  OB History      Para Term  AB Living    1         SAB TAB Ectopic Molar Multiple Live Births                   Ms. Jessica Bahena is admitted with pregnancy at [address] [place] [address] for induction of labor. Prenatal course was normal.Prenatal care has been followed by Maeve Lawson. Please see prenatal records for details. No past medical history on file. No past surgical history on file. Social History     Occupational History    Not on file. Social History Main Topics    Smoking status: Not on file    Smokeless tobacco: Not on file    Alcohol use Not on file    Drug use: Not on file    Sexual activity: Not on file     No family history on file. No Known Allergies  Prior to Admission medications    Medication Sig Start Date End Date Taking? Authorizing Provider   PNV GH.30/EZYZEIB FUM/FOLIC AC (PRENATAL PO) Take  by mouth. Yes Historical Provider   ERGOCALCIFEROL, VITAMIN D2, (VITAMIN D2 PO) Take  by mouth. Yes Historical Provider        Review of Systems: A comprehensive review of systems was negative except for that written in the HPI.     Objective:     Vitals:  Vitals:    17 1003 17 1127 17 1129 17 1306   BP: 141/90 141/86 141/86    Pulse: 84 90 90    Resp:   17    Temp:   97.4 °F (36.3 °C) 97.9 °F (36.6 °C)        Physical Exam:  Cervical Exam: 60/-2  FHTs: category 1  [place]: irregular  BPs slightly elevated - will order PIH panel      Prenatal Labs:   Lab Results   Component Value Date/Time    Rubella, External Immune 2017    GrBStrep, External negative 11/10/2017    HBsAg, External neg 2017    HIV, External neg 2017    RPR, External NR 2017    Gonorrhea, External neg 2017    Chlamydia, External neg 2017         Assessment/Plan:     Plan: Admit for induction of labor for post dates  GBS-  Pitocin per protocol  Will AROM when appropriate   Signed By:  Diana Triana MD     December 26, 2017